# Patient Record
Sex: FEMALE | Race: WHITE | NOT HISPANIC OR LATINO | Employment: FULL TIME | URBAN - METROPOLITAN AREA
[De-identification: names, ages, dates, MRNs, and addresses within clinical notes are randomized per-mention and may not be internally consistent; named-entity substitution may affect disease eponyms.]

---

## 2019-07-08 PROBLEM — F51.01 PRIMARY INSOMNIA: Status: ACTIVE | Noted: 2019-07-08

## 2019-07-08 PROBLEM — G43.719 INTRACTABLE CHRONIC MIGRAINE WITHOUT AURA AND WITHOUT STATUS MIGRAINOSUS: Status: ACTIVE | Noted: 2019-07-08

## 2019-12-23 PROBLEM — R21 FACIAL RASH: Status: ACTIVE | Noted: 2019-12-23

## 2020-01-27 PROBLEM — G43.901 STATUS MIGRAINOSUS: Status: ACTIVE | Noted: 2020-01-27

## 2020-09-02 PROBLEM — G43.719 INTRACTABLE CHRONIC MIGRAINE WITHOUT AURA AND WITHOUT STATUS MIGRAINOSUS: Status: RESOLVED | Noted: 2019-07-08 | Resolved: 2020-09-02

## 2021-03-10 PROBLEM — M79.7 FIBROMYALGIA: Status: ACTIVE | Noted: 2021-03-10

## 2021-08-05 PROBLEM — R63.4 UNEXPLAINED WEIGHT LOSS: Status: ACTIVE | Noted: 2021-08-05

## 2021-11-09 PROBLEM — M54.2 NECK PAIN: Status: ACTIVE | Noted: 2021-11-09

## 2022-05-17 PROBLEM — M51.9 LUMBAR DISC DISEASE: Status: ACTIVE | Noted: 2022-05-17

## 2023-02-10 PROBLEM — E86.0 MILD DEHYDRATION: Status: ACTIVE | Noted: 2023-02-10

## 2023-02-10 PROBLEM — G43.719 INTRACTABLE CHRONIC MIGRAINE WITHOUT AURA AND WITHOUT STATUS MIGRAINOSUS: Status: RESOLVED | Noted: 2019-07-08 | Resolved: 2023-02-10

## 2023-02-10 PROBLEM — R11.2 INTRACTABLE NAUSEA AND VOMITING: Status: ACTIVE | Noted: 2023-02-10

## 2023-02-17 ENCOUNTER — PATIENT MESSAGE (OUTPATIENT)
Dept: ADMINISTRATIVE | Facility: CLINIC | Age: 58
End: 2023-02-17

## 2023-02-17 ENCOUNTER — PATIENT OUTREACH (OUTPATIENT)
Dept: ADMINISTRATIVE | Facility: CLINIC | Age: 58
End: 2023-02-17

## 2023-02-17 NOTE — PROGRESS NOTES
C3 nurse attempted to contact Kavya Wiggins  for a TCC post hospital discharge follow up call. No answer. The patient does not have a scheduled HOSFU appointment, and the pt does not have an [facility] PCP.

## 2023-02-21 NOTE — PROGRESS NOTES
C3 nurse spoke with Kavya Wiggins for a TCC post hospital discharge follow up call. The patient reports does not have a scheduled HOSFU appointment. C3 nurse was unable to schedule [facility]. Patient advised to contact their PCP to schedule a HOSPFU within 5-7 days.

## 2023-07-28 PROBLEM — Z79.891 USE OF OPIATES FOR THERAPEUTIC PURPOSES: Status: ACTIVE | Noted: 2023-07-28
